# Patient Record
Sex: FEMALE | Race: BLACK OR AFRICAN AMERICAN
[De-identification: names, ages, dates, MRNs, and addresses within clinical notes are randomized per-mention and may not be internally consistent; named-entity substitution may affect disease eponyms.]

---

## 2017-02-15 ENCOUNTER — HOSPITAL ENCOUNTER (EMERGENCY)
Dept: HOSPITAL 62 - ER | Age: 42
Discharge: HOME | End: 2017-02-15
Payer: MEDICAID

## 2017-02-15 VITALS — SYSTOLIC BLOOD PRESSURE: 110 MMHG | DIASTOLIC BLOOD PRESSURE: 74 MMHG

## 2017-02-15 DIAGNOSIS — J06.9: Primary | ICD-10-CM

## 2017-02-15 DIAGNOSIS — R09.81: ICD-10-CM

## 2017-02-15 DIAGNOSIS — R10.9: ICD-10-CM

## 2017-02-15 DIAGNOSIS — F17.210: ICD-10-CM

## 2017-02-15 DIAGNOSIS — K21.9: ICD-10-CM

## 2017-02-15 DIAGNOSIS — R11.0: ICD-10-CM

## 2017-02-15 DIAGNOSIS — R50.9: ICD-10-CM

## 2017-02-15 PROCEDURE — 71020: CPT

## 2017-02-15 PROCEDURE — S0119 ONDANSETRON 4 MG: HCPCS

## 2017-02-15 PROCEDURE — 87804 INFLUENZA ASSAY W/OPTIC: CPT

## 2017-02-15 PROCEDURE — 99283 EMERGENCY DEPT VISIT LOW MDM: CPT

## 2017-02-15 NOTE — ER DOCUMENT REPORT
ED Flu Like





- General


Mode of Arrival: Ambulatory


Information source: Patient


TRAVEL OUTSIDE OF THE [place] IN LAST 30 DAYS: No





- HPI


Onset: Other - x2-3 days


Timing/Duration: Persistent


Quality of pain: Cramping


Associated symptoms: Other - see narrative





- General


Chief Complaint: Flu Symptoms


Stated Complaint: COUGH,CHILLS


Notes: 


Patient is a 41-year-old female who presents to the emergency department today 

with multiple generalized complaints including abdominal pain, cough, fever, 

and nasal congestion with associated nausea.  Patient states her son was 

diagnosed with pneumonia earlier today.  Patient states she has a productive 

cough with green colored sputum.  Patient denies a sore throat.  Patient states 

she has had the above-mentioned symptoms for the last 2-3 days. (IMAN CHAVEZ)





- Related Data


Allergies/Adverse Reactions: 


 





No Known Allergies Allergy (Verified 01/15/15 16:10)


 











Past Medical History





- General


Information source: Patient





- Social History


Smoking Status: Current Every Day Smoker


Cigarette use (# per day): Yes


Chew tobacco use (# tins/day): No


Frequency of alcohol use: None


Drug Abuse: None


Lives with: Family


Family History: Reviewed & Not Pertinent


Patient has suicidal ideation: No


Patient has homicidal ideation: No


Pulmonary Medical History: Reports: Hx Asthma, Hx Bronchitis


GI Medical History: Reports: Hx Gastroesophageal Reflux Disease


Past Surgical History: Reports: Hx Gynecologic Surgery - AB





- Immunizations


Hx Diphtheria, Pertussis, Tetanus Vaccination: Yes





Review of Systems





- Review of Systems


Constitutional: See HPI, Fever


EENT: See HPI, Nose congestion.  denies: Throat pain


Cardiovascular: No symptoms reported


Respiratory: See HPI, Cough


Gastrointestinal: See HPI, Abdominal pain, Nausea.  denies: Vomiting


Genitourinary: No symptoms reported


Female Genitourinary: No symptoms reported


Musculoskeletal: No symptoms reported


Skin: No symptoms reported


Hematologic/Lymphatic: No symptoms reported


Neurological/Psychological: No symptoms reported


-: Yes All other systems reviewed and negative





Physical Exam





- Vital signs


Vitals: 


 











Temp Pulse Resp BP Pulse Ox


 


 98.1 F   65   18   126/82 H  95 


 


 02/15/17 02:00  02/15/17 02:00  02/15/17 02:00  02/15/17 02:00  02/15/17 02:00








 (IMAN CHAVEZ)


 (NENITA FONG)





- Notes


Notes: 


Physical Exam:


 


General: Alert, appears well. 


 


HEENT: Normocephalic. Atraumatic. PERRL. Extraocular movements intact. 

Oropharynx clear.


 


Neck: Supple. Non-tender.


 


Respiratory: No respiratory distress. Clear and equal breath sounds bilaterally.


 


Cardiovascular: Regular rate and rhythm. 


 


Abdominal: Normal Inspection. Non-tender. No distension. Normal Bowel Sounds. 


 


Back: Non-tender. No deformity or step off.


 


Extremities: Moves all four extremities.


Upper extremities: Normal inspection. Normal ROM.  


Lower extremities: Normal inspection. No edema. Normal ROM.  


 


Neurological: Normal cognition. AAOx4. Normal speech.  


 


Psychological: Normal affect. Normal Mood. 


 


Skin: Warm. Dry. Normal color.  (IMAN CHAVEZ)





Course





- Re-evaluation


Re-evalutation: 





02/15/17 05:26


Patient with upper respiratory illness.  Flu negative.  Chest x-ray within 

normal limits.  Patient is tolerating by mouth.  Vitals are stable.  Follow-up 

with PMD.  Agrees with plan.  Stable for discharge. (NENITA FONG)





- Vital Signs


Vital signs: 


 











Temp Pulse Resp BP Pulse Ox


 


 98.1 F   65   18   126/82 H  95 


 


 02/15/17 02:00  02/15/17 02:00  02/15/17 02:00  02/15/17 02:00  02/15/17 02:00








 (IMAN CHAVEZ)


 (NENITA FONG)





Discharge





- Discharge


Clinical Impression: 


 Nausea





Upper respiratory infection


Qualifiers:


 URI type: unspecified URI Qualified Code(s): J06.9 - Acute upper respiratory 

infection, unspecified





Condition: Stable


Disposition: HOME, SELF-CARE


Instructions:  Upper Respiratory Illness ([facility])


Forms:  Parent Work Note, Return to Work


Referrals: 


KACI SEARS DO [Primary Care Provider] - Follow up as needed


Scribe Attestation: 





02/15/17 05:27


I personally performed the services described in the documentation, reviewed 

and edited the documentation which was dictated to the scribe in my presence, 

and it accurately records my words and actions. (NENITA FONG)





Scribe Documentation





- Scribe


Written by Scribe:: Da Cook, 2/15/2017 0332


acting as scribe for :: Lilli

## 2018-12-30 ENCOUNTER — HOSPITAL ENCOUNTER (EMERGENCY)
Dept: HOSPITAL 62 - ER | Age: 43
Discharge: HOME | End: 2018-12-30
Payer: MEDICAID

## 2018-12-30 VITALS — DIASTOLIC BLOOD PRESSURE: 82 MMHG | SYSTOLIC BLOOD PRESSURE: 115 MMHG

## 2018-12-30 DIAGNOSIS — F17.200: ICD-10-CM

## 2018-12-30 DIAGNOSIS — K08.9: Primary | ICD-10-CM

## 2018-12-30 PROCEDURE — 99282 EMERGENCY DEPT VISIT SF MDM: CPT

## 2018-12-30 NOTE — ER DOCUMENT REPORT
HPI





- HPI


Patient complains to provider of: tooth pain


Time Seen by Provider: 12/30/18 06:27


Pain Level: 5


Context: 





43-year-old female presents emergency department for tooth pain that started 3 

days ago.  She states that she was at the dentist 2 months ago and was diagnosed

with a cavity.  She also states that they did give her antibiotics for a 

possible infection and that she completed them.  She states she is tried taking 

naproxen and Tylenol with no relief.  She also says that her friend gave her 

some of her viscous lidocaine and she tried to have in the tooth with a Q-tip 

but that did not help.  She denies any fevers, chills, diaphoresis, trismus, 

sore throat, throat tightness patient, swelling or any other symptoms.





- REPRODUCTIVE


Reproductive: DENIES: Pregnant:





Past Medical History





- General


Information source: Patient





- Social History


Smoking Status: Current Some Day Smoker


Frequency of alcohol use: Occasional


Drug Abuse: None


Family History: Reviewed & Not Pertinent


Patient has suicidal ideation: No


Patient has homicidal ideation: No


Pulmonary Medical History: Reports: Hx Asthma, Hx Bronchitis


Renal/ Medical History: Denies: Hx Peritoneal Dialysis


GI Medical History: Reports: Hx Gastroesophageal Reflux Disease


Past Surgical History: Reports: Hx Gynecologic Surgery - AB





- Immunizations


Hx Diphtheria, Pertussis, Tetanus Vaccination: Yes





Vertical Provider Document





- CONSTITUTIONAL


Exam Limitations: No Limitations





- INFECTION CONTROL


TRAVEL OUTSIDE OF THE [place] IN LAST 30 DAYS: No





- HEENT


HEENT: Atraumatic


Mouth Diagram: 


                            __________________________














                            __________________________





 1 - Severe tenderness when tapping tooth. No gum infection. gingiva pink, no 

swelling. #4 tooth missing








- NECK


Neck: Normal Inspection, Supple





- RESPIRATORY


Respiratory: Breath Sounds Normal, No Respiratory Distress





- CARDIOVASCULAR


Cardiovascular: Regular Rate, Regular Rhythm





Course





- Re-evaluation


Re-evalutation: 





12/30/18 06:56


43-year-old female comes to the emergency department with tooth pain and the #5 

tooth.  She states she was seen at the dentist a couple of months ago and is 

unclear if there was an infection but she said she was prescribed an antibiotic 

and she took it to completion.  There is no evidence of gum infection, 

elicitation of pain when tapping on the tooth.  Most likely represents a 

possible cavity or pulpitis.  At this point plan is to give her some viscous 

lidocaine to help relieve her symptoms, send her home with a prescription for 

Tessalon Perles which she can apply 1 Perle every 8 hours, and close follow-up 

with her dentist.  She is stable and safe to discharge.





- Vital Signs


Vital signs: 


                                        











Temp Pulse Resp BP Pulse Ox


 


 98.5 F   70   18   115/82   97 


 


 12/30/18 06:13  12/30/18 06:13  12/30/18 06:13  12/30/18 06:13  12/30/18 06:13














Discharge





- Discharge


Clinical Impression: 


 Tooth pain





Condition: Good


Disposition: HOME, SELF-CARE


Instructions:  Toothache ([facility])


Additional Instructions: 


You were seen in the emergency department this evening for tooth pain.  All the 

gum tissue around her tooth looks healthy and there is no evidence of infection.

 It is most likely related to nerve pain from the recent procedure you had and 

you may need to follow-up with your dentist for possible root canal.  I have 

prescribed use of the call Tessalon Perles.  You should crack 1 of those open 

and rub it around the affected tooth only once every 8 hours.  If you develop 

severe swelling of the mouth, shortness of breath, high fever, or signs of 

infection please immediately return to the emergency department.


Referrals: 


SONNY RAO, FNP-C [Primary Care Provider] - Follow up as needed

## 2019-02-12 ENCOUNTER — HOSPITAL ENCOUNTER (OUTPATIENT)
Dept: HOSPITAL 62 - WI | Age: 44
End: 2019-02-12
Attending: FAMILY MEDICINE
Payer: MEDICAID

## 2019-02-12 DIAGNOSIS — Z12.31: Primary | ICD-10-CM

## 2019-02-12 DIAGNOSIS — N63.21: ICD-10-CM

## 2019-02-12 PROCEDURE — 77067 SCR MAMMO BI INCL CAD: CPT

## 2019-02-12 NOTE — WOMENS IMAGING REPORT
EXAM DESCRIPTION:  BILAT SCREENING MAMMO W/CAD



COMPLETED DATE/TIME:  2/12/2019 11:34 am



REASON FOR STUDY:  Z12.31 SCREENING [id number]  ENCNTR SCREEN MAMMOGRAM FOR MALIGNANT NEOPLASM OF BR
E



COMPARISON:  None.



TECHNIQUE:  Standard craniocaudal and mediolateral oblique views of each breast recorded using digita
l acquisition.



LIMITATIONS:  None.



FINDINGS:  RIGHT BREAST

MASSES: No suspicious masses.

CALCIFICATIONS: No new or suspicious calcifications.

ARCHITECTURAL DISTORTION: None.

DEVELOPING DENSITY: None.

ASYMMETRY: None noted.

OTHER: No other significant findings.

LEFT BREAST

MASSES: 3 mm smooth mass upper outer quadrant 8 cm from the nipple.

CALCIFICATIONS: No new or suspicious calcifications.

ARCHITECTURAL DISTORTION: None.

DEVELOPING DENSITY: None.

ASYMMETRY: None noted.

OTHER: No other significant findings.

Read with the assistance of CAD.

.[facility] - R2 Cenova Version 1.3

.[facility] - R2 Cenova Version 1.3

.[facility] - R2 Cenova Version 2.4

.[facility] - R2 Cenova Version 2.4

.[facility] - R2  Version 9.2



IMPRESSION:  Probable cyst or lymph node left breast.



BREAST DENSITY:  b. There are scattered areas of fibroglandular density.



BIRAD:  0 Incomplete:  Needs Additional Imaging Evaluation and/or prior Mammograms for Comparison.



RECOMMENDATION:  RECOMMENDED FOLLOW-UP: True lateral cone compression views and ultrasound left breas
t.

The patient will be contacted for additional imaging.



COMMENT:  The patient has been notified of the results by letter per SA requirements. Additional no
tification policies are in place for contacting patient with suspicious or incomplete findings.

Quality ID #225: The American College of Radiology recommends an annual screening mammogram for women
 aged 40 years or over. This facility utilizes a reminder system to ensure that all patients receive 
reminder letters, and/or direct phone calls for appointments. This includes reminders for routine scr
eening mammograms, diagnostic mammograms, or other Breast Imaging Interventions when appropriate.  Th
is patient will be placed in the appropriate reminder system.

The American College of Radiology (ACR) has developed recommendations for screening MRI of the breast
s in certain patient populations, to be used in conjunction with mammography.  Breast MRI surveillanc
e may be appropriate for women with more than 20% lifetime risk of developing breast cancer  as deter
mined by genetic testing, significant family history of the disease, or history of mantle radiation f
or Hodgkins Disease.  ACR Practice Guidelines 2008.



TECHNICAL DOCUMENTATION:  FINDING NUMBER: (1)

ASSESSMENT: (1)

JOB ID:  [id number]

 2011 [employer]- All Rights Reserved



Reading location - IP/workstation name: [email or web address]

## 2019-02-22 ENCOUNTER — HOSPITAL ENCOUNTER (OUTPATIENT)
Dept: HOSPITAL 62 - WI | Age: 44
End: 2019-02-22
Attending: NURSE PRACTITIONER
Payer: MEDICAID

## 2019-02-22 DIAGNOSIS — N63.21: Primary | ICD-10-CM

## 2019-02-22 PROCEDURE — 76642 ULTRASOUND BREAST LIMITED: CPT

## 2019-02-22 NOTE — WOMENS IMAGING REPORT
EXAM DESCRIPTION:  LEFT DIAGNOSTIC MAMMO W/CAD; U/S BREAST UNILAT LIMITED



COMPLETED DATE/TIME:  2/22/2019 11:17 am; 2/22/2019 12:32 pm



REASON FOR STUDY:  N63.21 UNSPECIFIED LUMP IN THE LEFT BREAST,UPPER OUTER QUADRANT; N63.21 LEFT BREAS
T N63.21  UNSPECIFIED LUMP IN THE LEFT BREAST, UPPER OUTER QUAD



COMPARISON:  2/12/2019 bilateral screening mammogram



TECHNIQUE:  Cone compression craniocaudal, 90 mediolateral and mediolateral oblique images of the br
east recorded with digital acquisition.

Left breast ultrasound was also performed



LIMITATIONS:  None.



FINDINGS:  BREAST: Left

MASSES: In the left breast upper outer quadrant, 1 to 2 o'clock position 7 cm from the nipple, a well
-circumscribed 5 mm low-density mammographic nodule present with central hilar fat, likely a tiny int
ramammary lymph node.  This correlates with ultrasound today.

In the left breast retroareolar region, a second, 5 mm nodule is present at the 3 o'clock retroareola
r region.  This was subsequently shown to represent a cluster of tiny cysts.

CALCIFICATIONS: No new or suspicious calcifications.

ARCHITECTURAL DISTORTION: None.

DEVELOPING DENSITY: None.

ASYMMETRY: None noted.

OTHER: No other significant findings.

Read with the assistance of CAD.

.[facility] - R2 Cenova Version 1.3

.[facility] - R2 Cenova Version 2.1

.\[facility]\"" Imaging - R2 Cenova Version 2.4

.[facility] - R2 Cenova Version 2.4

.[facility] - R2  Version 9.2

Left breast ultrasound:

Benign intramammary lymph node in the left breast 1 to 2 o'clock position, 5 x 4 mm in size.

Cluster of tiny cysts in the left breast retroareolar region 3 o'clock position, 5 mm in size.



IMPRESSION:  No mammographic or sonographic evidence for malignancy left breast



BREAST DENSITY:  b. There are scattered areas of fibroglandular density.



BIRAD:  2 Benign findings.



RECOMMENDATION:  RECOMMENDED FOLLOW UP: Please continue yearly bilateral screening mammography/ tomos
ynthesis in February 2020

SPECIFIC INTERVENTION/IMAGING/CONSULTATION RECOMMENDED:No additional intervention/ imaging/consultati
on needed at this time.

COMMUNICATION:Patient notified at the time of service



COMMENT:  The patient has been notified of the results by letter per MQSA requirements. Additional no
tification policies are in place for contacting patient with suspicious or incomplete findings.

Quality ID #225: The American College of Radiology recommends an annual screening mammogram for women
 aged 40 years or over. This facility utilizes a reminder system to ensure that all patients receive 
reminder letters, and/or direct phone calls for appointments. This includes reminders for routine scr
eening mammograms, diagnostic mammograms, or other Breast Imaging Interventions when appropriate.  Th
is patient will be placed in the appropriate reminder system.

The American College of Radiology (ACR) has developed recommendations for screening MRI of the breast
s in certain patient populations, to be used in conjunction with mammography.  Breast MRI surveillanc
e may be appropriate for women with more than 20% lifetime risk of developing breast cancer  as deter
mined by genetic testing, significant family history of the disease, or history of mantle radiation f
or Hodgkins Disease.  ACR Practice Guidelines 2008.



TECHNICAL DOCUMENTATION:  FINDING NUMBER: (1)

ASSESSMENT: (1)

JOB ID:  [id number]

 2011 [employer]- All Rights Reserved



Reading location - IP/workstation name: [email or web address]

## 2019-02-22 NOTE — WOMENS IMAGING REPORT
EXAM DESCRIPTION:  LEFT DIAGNOSTIC MAMMO W/CAD; U/S BREAST UNILAT LIMITED



COMPLETED DATE/TIME:  2/22/2019 11:17 am; 2/22/2019 12:32 pm



REASON FOR STUDY:  N63.21 UNSPECIFIED LUMP IN THE LEFT BREAST,UPPER OUTER QUADRANT; N63.21 LEFT BREAS
T N63.21  UNSPECIFIED LUMP IN THE LEFT BREAST, UPPER OUTER QUAD



COMPARISON:  2/12/2019 bilateral screening mammogram



TECHNIQUE:  Cone compression craniocaudal, 90 mediolateral and mediolateral oblique images of the br
east recorded with digital acquisition.

Left breast ultrasound was also performed



LIMITATIONS:  None.



FINDINGS:  BREAST: Left

MASSES: In the left breast upper outer quadrant, 1 to 2 o'clock position 7 cm from the nipple, a well
-circumscribed 5 mm low-density mammographic nodule present with central hilar fat, likely a tiny int
ramammary lymph node.  This correlates with ultrasound today.

In the left breast retroareolar region, a second, 5 mm nodule is present at the 3 o'clock retroareola
r region.  This was subsequently shown to represent a cluster of tiny cysts.

CALCIFICATIONS: No new or suspicious calcifications.

ARCHITECTURAL DISTORTION: None.

DEVELOPING DENSITY: None.

ASYMMETRY: None noted.

OTHER: No other significant findings.

Read with the assistance of CAD.

.[facility] - R2 Cenova Version 1.3

.[facility] - R2 Cenova Version 2.1

.[facility] - R2 Cenova Version 2.4

.[facility] - R2 Cenova Version 2.4

.[facility] - R2  Version 9.2

Left breast ultrasound:

Benign intramammary lymph node in the left breast 1 to 2 o'clock position, 5 x 4 mm in size.

Cluster of tiny cysts in the left breast retroareolar region 3 o'clock position, 5 mm in size.



IMPRESSION:  No mammographic or sonographic evidence for malignancy left breast



BREAST DENSITY:  b. There are scattered areas of fibroglandular density.



BIRAD:  2 Benign findings.



RECOMMENDATION:  RECOMMENDED FOLLOW UP: Please continue yearly bilateral screening mammography/ tomos
ynthesis in February 2020

SPECIFIC INTERVENTION/IMAGING/CONSULTATION RECOMMENDED:No additional intervention/ imaging/consultati
on needed at this time.

COMMUNICATION:Patient notified at the time of service



COMMENT:  The patient has been notified of the results by letter per MQSA requirements. Additional no
tification policies are in place for contacting patient with suspicious or incomplete findings.

Quality ID #225: The American College of Radiology recommends an annual screening mammogram for women
 aged 40 years or over. This facility utilizes a reminder system to ensure that all patients receive 
reminder letters, and/or direct phone calls for appointments. This includes reminders for routine scr
eening mammograms, diagnostic mammograms, or other Breast Imaging Interventions when appropriate.  Th
is patient will be placed in the appropriate reminder system.

The American College of Radiology (ACR) has developed recommendations for screening MRI of the breast
s in certain patient populations, to be used in conjunction with mammography.  Breast MRI surveillanc
e may be appropriate for women with more than 20% lifetime risk of developing breast cancer  as deter
mined by genetic testing, significant family history of the disease, or history of mantle radiation f
or Hodgkins Disease.  ACR Practice Guidelines 2008.



TECHNICAL DOCUMENTATION:  FINDING NUMBER: (1)

ASSESSMENT: (1)

JOB ID:  [id number]

 2011 [employer]- All Rights Reserved



Reading location - IP/workstation name: [email or web address]

## 2019-04-17 ENCOUNTER — HOSPITAL ENCOUNTER (EMERGENCY)
Dept: HOSPITAL 62 - ER | Age: 44
Discharge: HOME | End: 2019-04-17
Payer: MEDICAID

## 2019-04-17 VITALS — DIASTOLIC BLOOD PRESSURE: 76 MMHG | SYSTOLIC BLOOD PRESSURE: 113 MMHG

## 2019-04-17 DIAGNOSIS — F17.210: ICD-10-CM

## 2019-04-17 DIAGNOSIS — H92.01: ICD-10-CM

## 2019-04-17 DIAGNOSIS — E03.9: ICD-10-CM

## 2019-04-17 DIAGNOSIS — J06.9: Primary | ICD-10-CM

## 2019-04-17 PROCEDURE — 99406 BEHAV CHNG SMOKING 3-10 MIN: CPT

## 2019-04-17 PROCEDURE — 99282 EMERGENCY DEPT VISIT SF MDM: CPT

## 2019-04-17 NOTE — ER DOCUMENT REPORT
ED ENT





- General


Chief Complaint: Ear Pain


Stated Complaint: EAR PAIN


Time Seen by Provider: 04/17/19 09:08


Primary Care Provider: 


SONNY RAO FNP-C [Primary Care Provider] - Follow up in 3-5 days


Mode of Arrival: Ambulatory


Information source: Patient


Notes: 





43-year-old female presented to ED for complaint of right ear pain since 

yesterday.  She also has sinus drainage and cough for week no fever.  She states

she plans to go to [place] this weekend and she went to make sure whether or 

not she had an ear infection.  She states she smokes 2-3 times a month when she 

drinks which is 2-3 times a month.  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  She is 

alert oriented respirations regular and unlabored speaking in full sentences 

walks with a even steady gait.


TRAVEL OUTSIDE OF THE [place] IN LAST 30 DAYS: No





- HPI


Patient complains to provider of: Ear problem, Nose problem, Throat problem


Onset: Last week - Pain just started yesterday


Onset/Duration: Gradual


Quality of pain: Achy


Severity: Moderate


Pain Level: 4


Context: Recent Illness


Location of pain: Ears, Nose, Sinus, Throat


Associated symptoms: Congestion, Cough, Ear pain, Runny nose, Sinus pain, Sinus 

drainage, Sore throat


Similar symptoms previously: Yes


Recently seen / treated by doctor: No





- Related Data


Allergies/Adverse Reactions: 


                                        





No Known Allergies Allergy (Verified 04/17/19 08:07)


   











Past Medical History





- General


Information source: Patient





- Social History


Smoking Status: Current Some Day Smoker


Cigarette use (# per day): Yes - 2 times a month


Chew tobacco use (# tins/day): No


Smoking Education Provided: Yes - 4 minutes


Frequency of alcohol use: Occasional - 2 times a month


Drug Abuse: None


Occupation: Housewife


Lives with: Family


Family History: Reviewed & Not Pertinent


Patient has suicidal ideation: No


Patient has homicidal ideation: No





- Past Medical History


Cardiac Medical History: Reports: None


Pulmonary Medical History: Reports: Hx Asthma, Hx Bronchitis


EENT Medical History: Reports: None


Neurological Medical History: Reports: None


Endocrine Medical History: Reports: Hx Hypothyroidism


Renal/ Medical History: Reports: None


Malignancy Medical History: Reports: None


GI Medical History: Reports: Hx Gastroesophageal Reflux Disease


Musculoskeletal Medical History: Reports Hx Musculoskeletal Trauma


Skin Medical History: Reports None


Psychiatric Medical History: Reports: Hx Anxiety


Traumatic Medical History: Reports: None


Past Surgical History: Reports: Hx Gynecologic Surgery - AB





- Immunizations


Hx Diphtheria, Pertussis, Tetanus Vaccination: No





Review of Systems





- Review of Systems


Constitutional: Recent illness


EENT: Ear pain - Right, Nose congestion, Nose discharge, Sinus pressure, Sinus 

discharge, Throat pain


Cardiovascular: No symptoms reported


Respiratory: No symptoms reported


Gastrointestinal: No symptoms reported


Genitourinary: No symptoms reported


Female Genitourinary: No symptoms reported


Musculoskeletal: No symptoms reported


Skin: No symptoms reported


Hematologic/Lymphatic: No symptoms reported


Neurological/Psychological: No symptoms reported


-: Yes All other systems reviewed and negative





Physical Exam





- Vital signs


Vitals: 


                                        











Temp Pulse Resp BP Pulse Ox


 


 97.9 F   69   16   113/76   99 


 


 04/17/19 08:11  04/17/19 08:11  04/17/19 08:11  04/17/19 08:11  04/17/19 08:11











Interpretation: Normal





- General


General appearance: Appears well, Alert





- HEENT


Head: Normocephalic, Atraumatic


Eyes: Normal


Pupils: PERRL


Ears: Normal


External canal: Normal


Tympanic membrane: Normal


Sinus: Normal


Nasal: Purulent discharge, Septal hematoma


Mouth/Lips: Normal


Mucous membranes: Normal


Pharynx: Post nasal drainage


Neck: Normal





- Respiratory


Respiratory status: No respiratory distress


Chest status: Nontender


Breath sounds: Normal


Chest palpation: Normal





- Cardiovascular


Rhythm: Regular


Heart sounds: Normal auscultation


Murmur: No





- Abdominal


Inspection: Normal


Distension: No distension


Bowel sounds: Normal


Tenderness: Nontender


Organomegaly: No organomegaly





- Back


Back: Normal, Nontender





- Extremities


General upper extremity: Normal inspection, Nontender, Normal color, Normal ROM,

Normal temperature


General lower extremity: Normal inspection, Nontender, Normal color, Normal ROM,

Normal temperature, Normal weight bearing.  No: Stefano's sign





- Neurological


Neuro grossly intact: Yes


Cognition: Normal


Orientation: AAOx4


[place] Coma Scale Eye Opening: Spontaneous


[place]iel Coma Scale Verbal: Oriented


O[place]iel Coma Scale Motor: Obeys Commands


[place]iel Coma Scale Total: 15


Speech: Normal


Motor strength normal: LUE, RUE, LLE, RLE


Sensory: Normal





- Psychological


Associated symptoms: Normal affect, Normal mood





- Skin


Skin Temperature: Warm


Skin Moisture: Dry


Skin Color: Normal





Course





- Re-evaluation


Re-evalutation: 





04/17/19 10:17


After performing a Medical Screening Examination, I estimate there is LOW risk 

for ACUTE CORONARY SYNDROME, RESPIRATORY FAILURE, SEPSIS OR MENINGITIS, thus I 

consider the discharge disposition reasonable.  I have reevaluated this patient 

multiple times and no significant life threatening changes are noted. The 

patient and I have discussed the diagnosis and risks, and we agree with 

discharging home with close follow-up. We also discussed returning to the 

Emergency Department immediately if new or worsening symptoms occur. We have 

discussed the symptoms which are most concerning (e.g., changing or worsening 

pain, trouble swallowing or breathing, neck stiffness, fever) that necessitate 

immediate return.





- Vital Signs


Vital signs: 


                                        











Temp Pulse Resp BP Pulse Ox


 


 97.9 F   69   16   113/76   99 


 


 04/17/19 08:11  04/17/19 08:11  04/17/19 08:11  04/17/19 08:11  04/17/19 08:11














Discharge





- Discharge


Clinical Impression: 


 Otalgia of right ear





URI (upper respiratory infection)


Qualifiers:


 URI type: unspecified viral URI Qualified Code(s): J06.9 - Acute upper 

respiratory infection, unspecified





Condition: Stable


Disposition: HOME, SELF-CARE


Additional Instructions: 


UPPER RESPIRATORY ILLNESS:





     You have a viral infection of the respiratory passages -- a "cold."  This 

common infection causes nasal congestion, drainage, and often sore throat and 

cough.  It is highly contagious.  The disease usually lasts about 10 to 14 days.


     There is no "cure" for the viral infection -- it must run its course.  If 

there is a complication, such as bacterial infection in the nose, sinuses, 

middle ear, or bronchial tubes, antibiotics may be required.  The antibiotics 

won't affect the virus.


     Drink plenty of fluids.  A humidifier may help.  An expectorant medication 

or decongestant may make you more comfortable.  Use acetaminophen or ibuprofen 

for fever or aches.


     See the doctor if fever persists over two days, if there is any significant

worsening of your symptoms, or if you simply fail to improve as expected.








DECONGESTANT MEDICATION:


     A decongestant medicine has been prescribed.  Often this medicine is 

combined in the same tablet with an antihistamine or expectorant. This type of 

medicine is helpful in treating a bad cold or sinus condition, as well as in 

treatment of the nasal congestion of hay fever.  It is not of much benefit for l

maru infections.


     Decongestant medicines are related to stimulants.  They can cause an 

increase in blood pressure and heart rate.  Persons with heart disease and high 

blood pressure should not take decongestants without discussing this with the 

physician.


     If you develop palpitations, chest pain, headache, or tremors, stop the 

medicine and consult your physician.








COUGH-SUPPRESSANT & EXPECTORANT MEDICATION:


     You are to use a cough medication as needed for relief of symptoms.  This 

medicine is a combination of an expectorant (to make the mucous thinner and more

easily "coughed up") and a cough suppressant (to reduce the frequency of 

coughing).


     The cough-suppressant medicine is related to narcotics.  You may experience

mild nausea and sleepiness.  Some patients who are very sensitive to narcotics 

may have stomach pain from this medicine. Taking the medicine with food reduces 

these side effects.  Do not drive or work with machinery until you know how this

medicine affects you.


     The expectorant should have no side effects.  Iodine-containing 

expectorants (such as organidin) should not be taken by persons with active 

thyroid disease unless approved by your doctor.


     Call the doctor if you develop shortness of breath, hives, rash, itching, 

lightheadedness, or severe nausea and vomiting.








USE OF ACETAMINOPHEN (Tylenol):


     Acetaminophen may be taken for pain relief or fever control. It's much 

safer than aspirin, offering a wider range of "safe" dosages.  It is safe during

pregnancy.  Some brand names are Tylenol, Panadol, Datril, Anacin 3, Tempra, and

Liquiprin. Acetaminophen can be repeated every four hours.  The following are 

maximum recommended dosages:


>89 pounds or adults          650 mg to 900 mg


Acetaminophen can be repeated every four hours.  Maximum dose not to exceed 4000

mg a day.








SMOKING:


     If you smoke, you should stop smoking.  The tar and chemicals in cigarette 

smoke are harmful.  Smoking has been shown to cause:


          emphysema


          chronic bronchitis


          lung cancer


          mouth and throat cancer


          stomach and pancreas cancer


          premature aging


          birth defects


     In addition, smoking increases ear and lung infections in children of 

smokers.





Try Claritin 10 mg, Mucinex 600 mg, Sudafed 30 mg, Tylenol or Motrin 

over-the-counter, Flonase nasal spray over-the-counter use as instructed on box.

 This will help your cough cold congestion and relieve your pressure from your 

ears.  You also need to increase your water intake to 8-10 glasses a day.  If 

you are going to go drinking this weekend please make sure you drink 8-10 

glasses of water before you start drinking alcohol.





FOLLOW-UP CARE:


If you have been referred to a physician for follow-up care, call the 

physicians office for an appointment as you were instructed or within the next 

two days.  If you experience worsening or a significant change in your symptoms,

notify the physician immediately or return to the Emergency Department at any 

time for re-evaluation.





Forms:  Smoking Cessation Education


Referrals: 


SONNY RAO, GONZALO-C [Primary Care Provider] - Follow up in 3-5 days

## 2019-06-01 ENCOUNTER — HOSPITAL ENCOUNTER (EMERGENCY)
Dept: HOSPITAL 62 - ER | Age: 44
Discharge: HOME | End: 2019-06-01
Payer: MEDICAID

## 2019-06-01 VITALS — DIASTOLIC BLOOD PRESSURE: 76 MMHG | SYSTOLIC BLOOD PRESSURE: 112 MMHG

## 2019-06-01 DIAGNOSIS — R20.2: ICD-10-CM

## 2019-06-01 DIAGNOSIS — F41.9: ICD-10-CM

## 2019-06-01 DIAGNOSIS — R11.0: ICD-10-CM

## 2019-06-01 DIAGNOSIS — R06.02: ICD-10-CM

## 2019-06-01 DIAGNOSIS — E03.9: ICD-10-CM

## 2019-06-01 DIAGNOSIS — M79.602: Primary | ICD-10-CM

## 2019-06-01 LAB
ADD MANUAL DIFF: NO
ANION GAP SERPL CALC-SCNC: 12 MMOL/L (ref 5–19)
BASOPHILS # BLD AUTO: 0.1 10^3/UL (ref 0–0.2)
BASOPHILS NFR BLD AUTO: 1.4 % (ref 0–2)
BUN SERPL-MCNC: 17 MG/DL (ref 7–20)
CALCIUM: 9.7 MG/DL (ref 8.4–10.2)
CHLORIDE SERPL-SCNC: 104 MMOL/L (ref 98–107)
CO2 SERPL-SCNC: 28 MMOL/L (ref 22–30)
EOSINOPHIL # BLD AUTO: 0.3 10^3/UL (ref 0–0.6)
EOSINOPHIL NFR BLD AUTO: 4.9 % (ref 0–6)
ERYTHROCYTE [DISTWIDTH] IN BLOOD BY AUTOMATED COUNT: 12.8 % (ref 11.5–14)
GLUCOSE SERPL-MCNC: 93 MG/DL (ref 75–110)
HCT VFR BLD CALC: 41.5 % (ref 36–47)
HGB BLD-MCNC: 14.3 G/DL (ref 12–15.5)
LYMPHOCYTES # BLD AUTO: 1.8 10^3/UL (ref 0.5–4.7)
LYMPHOCYTES NFR BLD AUTO: 32.8 % (ref 13–45)
MCH RBC QN AUTO: 32.6 PG (ref 27–33.4)
MCHC RBC AUTO-ENTMCNC: 34.4 G/DL (ref 32–36)
MCV RBC AUTO: 95 FL (ref 80–97)
MONOCYTES # BLD AUTO: 0.4 10^3/UL (ref 0.1–1.4)
MONOCYTES NFR BLD AUTO: 7.9 % (ref 3–13)
NEUTROPHILS # BLD AUTO: 2.9 10^3/UL (ref 1.7–8.2)
NEUTS SEG NFR BLD AUTO: 53 % (ref 42–78)
PLATELET # BLD: 257 10^3/UL (ref 150–450)
POTASSIUM SERPL-SCNC: 3.5 MMOL/L (ref 3.6–5)
RBC # BLD AUTO: 4.39 10^6/UL (ref 3.72–5.28)
SODIUM SERPL-SCNC: 143.7 MMOL/L (ref 137–145)
TOTAL CELLS COUNTED % (AUTO): 100 %
WBC # BLD AUTO: 5.4 10^3/UL (ref 4–10.5)

## 2019-06-01 PROCEDURE — 93005 ELECTROCARDIOGRAM TRACING: CPT

## 2019-06-01 PROCEDURE — 85025 COMPLETE CBC W/AUTO DIFF WBC: CPT

## 2019-06-01 PROCEDURE — 99283 EMERGENCY DEPT VISIT LOW MDM: CPT

## 2019-06-01 PROCEDURE — 93010 ELECTROCARDIOGRAM REPORT: CPT

## 2019-06-01 PROCEDURE — 84484 ASSAY OF TROPONIN QUANT: CPT

## 2019-06-01 PROCEDURE — 36415 COLL VENOUS BLD VENIPUNCTURE: CPT

## 2019-06-01 PROCEDURE — 80048 BASIC METABOLIC PNL TOTAL CA: CPT
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
## 2019-10-01 ENCOUNTER — HOSPITAL ENCOUNTER (OUTPATIENT)
Dept: HOSPITAL 62 - OD | Age: 44
End: 2019-10-01
Attending: NURSE PRACTITIONER
Payer: MEDICAID

## 2019-10-01 DIAGNOSIS — M79.675: Primary | ICD-10-CM

## 2019-10-01 NOTE — RADIOLOGY REPORT (SQ)
EXAM DESCRIPTION:  TOES LEFT



COMPLETED DATE/TIME:  10/1/2019 11:27 am



REASON FOR STUDY:  PAIN IN LEFT TOE(S) M79.675  PAIN IN LEFT TOE(S)



COMPARISON:  None.



NUMBER OF VIEWS:  Three views.



TECHNIQUE:  AP, lateral, and oblique images acquired of the left toes.



LIMITATIONS:  None.



FINDINGS:  MINERALIZATION: Normal.

BONES: No acute fracture or dislocation.  No worrisome bone lesions.

JOINTS: No effusions.

SOFT TISSUES: No soft tissue swelling.  No foreign body.

OTHER: No other significant finding.



IMPRESSION:  NEGATIVE STUDY OF THE LEFT TOES. NO RADIOGRAPHIC EVIDENCE OF ACUTE INJURY.



COMMENT:  SITE OF TRAUMA/COMPLAINT MARKED/STAMP COMPLETED: YES.



TECHNICAL DOCUMENTATION:  JOB ID:  [id number]

 2011 [employer]- All Rights Reserved



Reading location - IP/workstation name: [email or web address]

## 2019-11-15 ENCOUNTER — HOSPITAL ENCOUNTER (OUTPATIENT)
Dept: HOSPITAL 62 - OD | Age: 44
End: 2019-11-15
Attending: OTOLARYNGOLOGY
Payer: MEDICAID

## 2019-11-15 DIAGNOSIS — J30.9: Primary | ICD-10-CM

## 2019-11-15 PROCEDURE — 82785 ASSAY OF IGE: CPT

## 2019-11-15 PROCEDURE — 36415 COLL VENOUS BLD VENIPUNCTURE: CPT

## 2019-11-15 PROCEDURE — 86003 ALLG SPEC IGE CRUDE XTRC EA: CPT

## 2020-03-23 ENCOUNTER — HOSPITAL ENCOUNTER (OUTPATIENT)
Dept: HOSPITAL 62 - WI | Age: 45
End: 2020-03-23
Attending: NURSE PRACTITIONER
Payer: MEDICAID

## 2020-03-23 DIAGNOSIS — Z12.31: Primary | ICD-10-CM

## 2020-03-23 PROCEDURE — 77067 SCR MAMMO BI INCL CAD: CPT

## 2020-03-23 NOTE — WOMENS IMAGING REPORT
EXAM DESCRIPTION:  BILAT SCREENING MAMMO W/CAD



COMPLETED DATE/TIME:  3/23/2020 9:52 am



REASON FOR STUDY:  Z12.31 SCREENING MAMMO Z12.31  ENCNTR SCREEN MAMMOGRAM FOR MALIGNANT NEOPLASM OF B
RE



COMPARISON:  2019



EXAM PARAMETERS:  Standard craniocaudal and mediolateral oblique views of each breast recorded using 
digital acquisition.

Read with the assistance of CAD.

.[facility] - R2  Version 9.2



LIMITATIONS:  None.



FINDINGS:  No suspicious masses, suspicious calcifications or architectural distortion. No areas of c
oncern.



IMPRESSION:  NEGATIVE MAMMOGRAM.  BIRADS 1



BREAST DENSITY:  b. There are scattered areas of fibroglandular density.



BIRAD:  ASSESSMENT:  1 NEGATIVE



RECOMMENDATION:  ROUTINE SCREENING



COMMENT:  The patient has been notified of the results by letter per MQSA requirements. Additional no
tification policies are in place for contacting patient with suspicious or incomplete findings.

Quality ID #225: The American College of Radiology recommends an annual screening mammogram for women
 aged 40 years or over. This facility utilizes a reminder system to ensure that all patients receive 
reminder letters, and/or direct phone calls for appointments. This includes reminders for routine scr
eening mammograms, diagnostic mammograms, or other Breast Imaging Interventions when appropriate.  Th
is patient will be placed in the appropriate reminder system.



TECHNICAL DOCUMENTATION:  FINDING NUMBER: (1)

ASSESSMENT: (1)

JOB ID:  [id number]

 2011 [employer]- All Rights Reserved



Reading location - IP/workstation name: [email or web address]